# Patient Record
Sex: MALE | Race: WHITE | NOT HISPANIC OR LATINO
[De-identification: names, ages, dates, MRNs, and addresses within clinical notes are randomized per-mention and may not be internally consistent; named-entity substitution may affect disease eponyms.]

---

## 2020-07-14 PROBLEM — Z00.00 ENCOUNTER FOR PREVENTIVE HEALTH EXAMINATION: Status: ACTIVE | Noted: 2020-07-14

## 2020-07-15 ENCOUNTER — RESULT REVIEW (OUTPATIENT)
Age: 70
End: 2020-07-15

## 2020-07-15 ENCOUNTER — LABORATORY RESULT (OUTPATIENT)
Age: 70
End: 2020-07-15

## 2020-07-15 ENCOUNTER — APPOINTMENT (OUTPATIENT)
Dept: GASTROENTEROLOGY | Facility: CLINIC | Age: 70
End: 2020-07-15
Payer: MEDICARE

## 2020-07-15 VITALS
WEIGHT: 170 LBS | DIASTOLIC BLOOD PRESSURE: 60 MMHG | TEMPERATURE: 98.7 F | HEART RATE: 80 BPM | OXYGEN SATURATION: 96 % | BODY MASS INDEX: 25.76 KG/M2 | SYSTOLIC BLOOD PRESSURE: 158 MMHG | HEIGHT: 68 IN

## 2020-07-15 LAB
A1AT SERPL-MCNC: 216 MG/DL
CERULOPLASMIN SERPL-MCNC: 29 MG/DL
FERRITIN SERPL-MCNC: 300 NG/ML

## 2020-07-15 PROCEDURE — 36415 COLL VENOUS BLD VENIPUNCTURE: CPT

## 2020-07-15 PROCEDURE — 99214 OFFICE O/P EST MOD 30 MIN: CPT | Mod: 25

## 2020-07-15 NOTE — ASSESSMENT
[FreeTextEntry1] : -LFTs/abd pain - despite neg US, will pursue MRCP to eval for sludge/stones.  Serologic w/u  ordered as well.\par Doubt recurrence of ampullary lesion w/ prior negative long term f/u, neg US for scotty dil, staggered symptoms of pain.  However, EUS/EGD may be required if no findings.\par \par - Colon cancer screening - colonoscopy due in 11/2020 - needs extended preps.\par \par \par

## 2020-07-15 NOTE — HISTORY OF PRESENT ILLNESS
[FreeTextEntry1] : 70m AF/flutter/ablation, appy, choly, HLD, HTN, GERD, preDM, diverticulitis, colon polyps, periampullary adenoma (villous) - at [facility] [place] ('03), multiple negative evals since, here for abnormal LFTs -\par \par Called by PMD yesterday for abnromal labs:\par 7/13/20 CBC, amylase, lip normal\par AST//138, , TB 5.5/DB 3.5\par US this AM: s/p cholyu, no scotty dil\par \par In speaking to pt, states that for last week, he has had sharp upper abd pain intermittently, reminiscent of his initial gallbladder pain.  Sx severe,  nonprandial, but discrete on/off.  No fever/n/v.  Had dark urine 5d ago w/ worst bout - today urine clear.  No wt loss.  No pain now.  No new meds/herbals/OTCs exc Multivitamin and berry extract.\par  ________________________________________________\par        MRI 2009 low attn liver lesion superior liver, hemangioma, pneumobilia\par        CT 2009 2 small low attn lesion liver; adrenal lesion 1.6cm.\par        EGD 3/2009 gastritis, no amp. adenoma\par        Colonoscopy 2010 ([facility]) 3 polyps - int. hem., limitations in prep noted - 3 y f/u rec\par        EUS 2010 ( Dr. Gallandauer) ?chronic pancreatitis; L adrenal adenoma 1.6cm\par        Colonoscopy 11/2015 w/ nonadenomatous polyp - rec 5y for hx polyps.\par 2015, pt denied any sx at all. Pt referred for MRI to f/u chronic pancreatitis possibility from EUS, and also EGD side-viewing scope, but never f/u for either until \par 2017 eval epig pain: \par 9/2017 CT+:  Mild antral thickening may represent gastritis.  Stable 1.7 cm left adrenal nodule, likely adrenal adenoma. MRI can be performed as clinically indicated. Diverticula without evidence of diverticulitis. \par  No radiographic evidence of pancreatitis.  Nonobstructing nephrolithiasis lower pole left kidney. \par  Mildly enlarged prostate. (advosed  eval RE: adrenal and prostate, and EGD)\par 10/2017 EGD neg for abd pain (TJF scope - absent ampulla w/ bile flowing\par \par Soc:  no tobacco or significant EtOH\par FHx: no FHx GI malignancy or IBD\par \par ROS:\par Constitutional:: no weight loss, fevers\par ENT: no deafness\par Eyes: not blind\par Neck: no LN\par Chest: no dyspnea/cough\par Cardiac: no chest pain\par Vascular: no leg swelling\par GI: no abdominal pain, nausea, vomiting, diarrhea, constipation, rectal bleeding, dysphagia, melena unless otherwise noted in HPI\par : no dysuria, dark urine\par Skin: no rashes, jaundice\par Heme: no bleeding\par Endocrine: no DM unless otherwise stated in HPI\par \par Px: (VS noted below)\par General: NAD\par Eyes: anicteric\par Oropharynx:  clear\par Neck: no LN\par Chest: normal respiratory effort\par CVS: regular\par Abd: soft, NT, ND, +BS, no HSM\par Ext: no atrophy\par Neuro: grossly nonfocal\par \par Labs/imaging/prior endoscopic results reviewed to the extent available and noted in HPI\par

## 2020-07-17 ENCOUNTER — RESULT REVIEW (OUTPATIENT)
Age: 70
End: 2020-07-17

## 2020-07-17 LAB
ALBUMIN SERPL ELPH-MCNC: 4.6 G/DL
ALP BLD-CCNC: 222 U/L
ALT SERPL-CCNC: 171 U/L
ANA SER IF-ACNC: NEGATIVE
AST SERPL-CCNC: 60 U/L
BILIRUB DIRECT SERPL-MCNC: 0.7 MG/DL
BILIRUB INDIRECT SERPL-MCNC: 0.9 MG/DL
BILIRUB SERPL-MCNC: 1.6 MG/DL
ENDOMYSIUM IGA SER QL: NEGATIVE
ENDOMYSIUM IGA TITR SER: NORMAL
GLIADIN IGA SER QL: 5.7 UNITS
GLIADIN IGG SER QL: <5 UNITS
GLIADIN PEPTIDE IGA SER-ACNC: NEGATIVE
GLIADIN PEPTIDE IGG SER-ACNC: NEGATIVE
HBV CORE IGM SER QL: NONREACTIVE
HBV E AB SER QL: NEGATIVE
HBV E AG SER QL: NEGATIVE
HBV SURFACE AB SER QL: NONREACTIVE
HBV SURFACE AG SER QL: NONREACTIVE
HCV AB SER QL: ABNORMAL
HCV S/CO RATIO: 1.41 S/CO
HEPATITIS A IGG ANTIBODY: NONREACTIVE
IGA SER QL IEP: 381 MG/DL
IGG SER QL IEP: 1054 MG/DL
INR PPP: 1.02 RATIO
IRON SATN MFR SERPL: 21 %
IRON SERPL-MCNC: 57 UG/DL
LKM AB SER QL IF: <20.1 UNITS
MITOCHONDRIA AB SER IF-ACNC: NORMAL
PROT SERPL-MCNC: 6.9 G/DL
PT BLD: 12.1 SEC
SMOOTH MUSCLE AB SER QL IF: NORMAL
TIBC SERPL-MCNC: 272 UG/DL
TTG IGA SER IA-ACNC: <1.2 U/ML
TTG IGA SER-ACNC: NEGATIVE
TTG IGG SER IA-ACNC: 1.3 U/ML
TTG IGG SER IA-ACNC: NEGATIVE
UIBC SERPL-MCNC: 215 UG/DL

## 2020-07-21 ENCOUNTER — RESULT REVIEW (OUTPATIENT)
Age: 70
End: 2020-07-21

## 2020-07-23 ENCOUNTER — RESULT REVIEW (OUTPATIENT)
Age: 70
End: 2020-07-23

## 2020-07-23 ENCOUNTER — APPOINTMENT (OUTPATIENT)
Dept: GASTROENTEROLOGY | Facility: HOSPITAL | Age: 70
End: 2020-07-23

## 2020-07-29 ENCOUNTER — APPOINTMENT (OUTPATIENT)
Dept: GASTROENTEROLOGY | Facility: CLINIC | Age: 70
End: 2020-07-29
Payer: MEDICARE

## 2020-07-29 VITALS
HEIGHT: 68 IN | OXYGEN SATURATION: 96 % | HEART RATE: 67 BPM | WEIGHT: 170 LBS | BODY MASS INDEX: 25.76 KG/M2 | DIASTOLIC BLOOD PRESSURE: 74 MMHG | SYSTOLIC BLOOD PRESSURE: 130 MMHG

## 2020-07-29 DIAGNOSIS — R94.5 ABNORMAL RESULTS OF LIVER FUNCTION STUDIES: ICD-10-CM

## 2020-07-29 PROCEDURE — 36415 COLL VENOUS BLD VENIPUNCTURE: CPT

## 2020-07-29 PROCEDURE — 99214 OFFICE O/P EST MOD 30 MIN: CPT | Mod: 25

## 2020-07-29 NOTE — CONSULT LETTER
[FreeTextEntry1] : Dear Dr. Monae\par \par I had the pleasure of evaluating your patient,  KONSTANTIN TAVARES.\par \par Please refer to my note below.\par \par Thank you very much for allowing me to participate in the care of this patient.  If you have any questions, please do not hesitate to contact me.\par \par Sincerely, \par \par Austin Maravilla MD\par

## 2020-07-29 NOTE — ASSESSMENT
[FreeTextEntry1] : -epig discomfort - atypical for pancreatitis - improves w/ oral intake - repeat labs, trial PPI\par -panc cyst - repeat MRCP / MRI+/- 2years; adrenal adenoma stable vs 2017 f/u as per PMD if indicated\par - Colon cancer screening - colonoscopy due in 11/2020 - needs extended preps.\par \par \par

## 2020-07-29 NOTE — HISTORY OF PRESENT ILLNESS
[FreeTextEntry1] : 70m AF/flutter/ablation, appy, choly, HLD, HTN, GERD, preDM, diverticulitis, colon polyps, periampullary adenoma (villous) - at [facility] [place] ('03), multiple negative evals since, here for f/u - had inc tb to 5.5--MRCP w/ cbd stones --> ERCP 7/23/20 w./ Dr. Owusu - sphincterotomy/plasty/extensive stones/debris disimpacted.  Pt had upper abd pain that night, but did not call. Sx seemed to wane but had recurrent upper abdominal discomfort at night, most nights since, but no fever/n/v.  Sx improve w/ eating.  Went to ER 7/27 w/ mild transaminases, normal bili (all better than preprocedrue (AST normal, ALT 91,  vs 60/171/222) but lipase 67 (ULN 60) and CT+:Question very subtle infiltrative changes of the of the pancreatic head/neck parenchyma, correlate for biochemical evidence of pancreatitis\par \par  ________________________________________________\par        MRI 2009 low attn liver lesion superior liver, hemangioma, pneumobilia\par        CT 2009 2 small low attn lesion liver; adrenal lesion 1.6cm.\par        EGD 3/2009 gastritis, no amp. adenoma\par        Colonoscopy 2010 ([facility]) 3 polyps - int. hem., limitations in prep noted - 3 y f/u rec\par        EUS 2010 ( Dr. Gallandauer) ?chronic pancreatitis; L adrenal adenoma 1.6cm\par        Colonoscopy 11/2015 w/ nonadenomatous polyp - rec 5y for hx polyps.\par 2015, pt denied any sx at all. Pt referred for MRI to f/u chronic pancreatitis possibility from EUS, and also EGD side-viewing scope, but never f/u for either until \par 2017 eval epig pain: \par 9/2017 CT+:  Mild antral thickening may represent gastritis.  Stable 1.7 cm left adrenal nodule, likely adrenal adenoma. MRI can be performed as clinically indicated. Diverticula without evidence of diverticulitis. \par  No radiographic evidence of pancreatitis.  Nonobstructing nephrolithiasis lower pole left kidney. \par  Mildly enlarged prostate. (advosed  eval RE: adrenal and prostate, and EGD)\par 10/2017 EGD neg for abd pain (TJF scope - absent ampulla w/ bile flowing\par 7/2020 MRCP: Large common bile duct stone versus impacted sludge in the distal common bile duct measuring 2.1 x1.0 cm. Additional apparent filling defect within the left hepatic duct only seen on one of thesequences more likely artifactual or related to localized pneumobilia. Recommend correlation with\par ERCP. Moderate intrahepatic and extrahepatic biliary ductal dilatation, similar compared with a CT from\par 09/20/2017. 4 mm cystic pancreatic tail lesion. Advise follow-up MRI/MRCP with and without contrast in twoyears. 1.9 cm left adrenal adenoma.\par \par \par \par Soc:  no tobacco or significant EtOH\par FHx: no FHx GI malignancy or IBD\par \par ROS:\par Constitutional:: no weight loss, fevers\par ENT: no deafness\par Eyes: not blind\par Neck: no LN\par Chest: no dyspnea/cough\par Cardiac: no chest pain\par Vascular: no leg swelling\par GI: no abdominal pain, nausea, vomiting, diarrhea, constipation, rectal bleeding, dysphagia, melena unless otherwise noted in HPI\par : no dysuria, dark urine\par Skin: no rashes, jaundice\par Heme: no bleeding\par Endocrine: no DM unless otherwise stated in HPI\par \par Px: (VS noted below)\par General: NAD\par Eyes: anicteric\par Oropharynx:  clear\par Neck: no LN\par Chest: normal respiratory effort\par CVS: regular\par Abd: soft, NT, ND, +BS, no HSM\par Ext: no atrophy\par Neuro: grossly nonfocal\par \par Labs/imaging/prior endoscopic results reviewed to the extent available and noted in HPI\par

## 2020-07-30 LAB
ALBUMIN SERPL ELPH-MCNC: 4.6 G/DL
ALP BLD-CCNC: 151 U/L
ALT SERPL-CCNC: 77 U/L
AST SERPL-CCNC: 37 U/L
BILIRUB DIRECT SERPL-MCNC: 0.5 MG/DL
BILIRUB INDIRECT SERPL-MCNC: 0.7 MG/DL
BILIRUB SERPL-MCNC: 1.2 MG/DL
LPL SERPL-CCNC: 59 U/L
PROT SERPL-MCNC: 6.8 G/DL

## 2021-05-27 ENCOUNTER — TRANSCRIPTION ENCOUNTER (OUTPATIENT)
Age: 71
End: 2021-05-27

## 2021-05-27 ENCOUNTER — APPOINTMENT (OUTPATIENT)
Dept: GASTROENTEROLOGY | Facility: CLINIC | Age: 71
End: 2021-05-27
Payer: MEDICARE

## 2021-05-27 VITALS
DIASTOLIC BLOOD PRESSURE: 88 MMHG | WEIGHT: 170 LBS | HEART RATE: 65 BPM | OXYGEN SATURATION: 97 % | HEIGHT: 68 IN | TEMPERATURE: 97.2 F | BODY MASS INDEX: 25.76 KG/M2 | SYSTOLIC BLOOD PRESSURE: 138 MMHG

## 2021-05-27 DIAGNOSIS — R10.13 EPIGASTRIC PAIN: ICD-10-CM

## 2021-05-27 DIAGNOSIS — B37.81 CANDIDAL ESOPHAGITIS: ICD-10-CM

## 2021-05-27 DIAGNOSIS — K86.2 CYST OF PANCREAS: ICD-10-CM

## 2021-05-27 DIAGNOSIS — Z86.010 PERSONAL HISTORY OF COLONIC POLYPS: ICD-10-CM

## 2021-05-27 PROCEDURE — 99214 OFFICE O/P EST MOD 30 MIN: CPT

## 2021-05-27 NOTE — ASSESSMENT
[FreeTextEntry1] : -dysphagia/candida esophagitis - will extend fluconazole to 3 wk course for candida esophagitis.   Will f/u after that to see if sx improved.  His oropharyngeal component is not clearly explained by this - may consider criopharyngeal tx w/ Dr. Guerrero, possibly manometry, once we see effect of diflucan tx.  Has plan for repeat TNE with bx with ENT.  Will look for results when available.  Suspect the candida is secondary to his trial of prednisone recently for his muscle sx.  \par -panc cyst - repeat MRCP / MRI+/- 7/2022; adrenal adenoma stable vs 2017 f/u as per PMD if indicated\par -hx polyps/ Colon cancer screening - colonoscopy due in 11/2020 - needs extended preps - will schedule on f/u in 2-3 wks once dysphagia elucidated further.\par -other - pt to consider seeing neuro for his muscular pains in light of the hx of numbness UEs?\par \par \par

## 2021-05-27 NOTE — HISTORY OF PRESENT ILLNESS
[FreeTextEntry1] : 70m AF/flutter/ablation, appy, choly, ERCP/stones '20,  HLD, HTN, GERD, preDM, diverticulitis, colon polyps, periampullary adenoma (villous) - at [facility] [place] ('03), multiple negative evals since, here for f/u -\par \par New problem- dysphagia.  3 wks ago noted relatively acute onset of difficulty swallowing solids > liquids, with sensation of food sticking in lower neck.  Has been very anxious and eating less as a result.  No impactions or choking episodes.  He lost 10lbs in this time - though stable vs 1 y ago in this office.  No fever, melena.  \par \par Saw ENT - MBSS with some pharyngeal dysphagia; TNE w/ Dr. Guerrero - cinthia - started diflucan yesterday.  Has appt for f/u TNE with bx.  CT neck was done via Dr. Guerrero - results pending.\par \par He does state that he has had diffuse muscle pains over the last 3months with evaluations by  2 rheumatologists.  W/U reportedly neg for polymyositis - had trial of prednisone even - w/o improvement.  He also reported some numbness in b/l UEs distally - on waking, but resolves.  No weakness.\par \par  ________________________________________________\par 2009 2009 low attn liver lesion superior liver, hemangioma, pneumobilia\par        CT 2009 2 small low attn lesion liver; adrenal lesion 1.6cm.\par        EGD 3/2009 gastritis, no amp. adenoma\par        Colonoscopy 2010 ([facility]) 3 polyps - int. hem., limitations in prep noted - 3 y f/u rec\par        EUS 2010 ( Dr. Gallandauer) ?chronic pancreatitis; L adrenal adenoma 1.6cm\par        Colonoscopy 11/2015 w/ nonadenomatous polyp - rec 5y for hx polyps.\par 2015, pt denied any sx at all. Pt referred for MRI to f/u chronic pancreatitis possibility from EUS, and also EGD side-viewing scope, but never f/u for either until \par 2017 eval epig pain: \par 9/2017 CT+:  Mild antral thickening may represent gastritis.  Stable 1.7 cm left adrenal nodule, likely adrenal adenoma. MRI can be performed as clinically indicated. Diverticula without evidence of diverticulitis. \par  No radiographic evidence of pancreatitis.  Nonobstructing nephrolithiasis lower pole left kidney. \par  Mildly enlarged prostate. (advosed  eval RE: adrenal and prostate, and EGD)\par 10/2017 EGD neg for abd pain (TJF scope - absent ampulla w/ bile flowing\par 7/2020 MRCP: Large common bile duct stone versus impacted sludge in the distal common bile duct measuring 2.1 x1.0 cm. Additional apparent filling defect within the left hepatic duct only seen on one of thesequences more likely artifactual or related to localized pneumobilia. Recommend correlation with\par ERCP. Moderate intrahepatic and extrahepatic biliary ductal dilatation, similar compared with a CT from\par 09/20/2017. 4 mm cystic pancreatic tail lesion. Advise follow-up MRI/MRCP with and without contrast in twoyears. 1.9 cm left adrenal adenoma.\par 2020:  inc tb to 5.5--MRCP w/ cbd stones --> ERCP 7/23/20 w./ Dr. Owusu - sphincterotomy/plasty/extensive stones/debris disimpacted.  Very mild pancreatitis post-procedure. (ER 7/27/21 upper abd pain w/ mild transaminases, normal bili (all better than preprocedrue (AST normal, ALT 91,  vs 60/171/222) but lipase 67 (ULN 60) and CT+:Question very subtle infiltrative changes of the of the pancreatic head/neck parenchyma, correlate for biochemical evidence of pancreatitis\par \par \par Soc:  no tobacco or significant EtOH\par FHx: no FHx GI malignancy or IBD\par \par ROS:\par Constitutional:: no weight loss, fevers\par ENT: no deafness\par Eyes: not blind\par Neck: no LN\par Chest: no dyspnea/cough\par Cardiac: no chest pain\par Vascular: no leg swelling\par GI: no abdominal pain, nausea, vomiting, diarrhea, constipation, rectal bleeding, dysphagia, melena unless otherwise noted in HPI\par : no dysuria, dark urine\par Skin: no rashes, jaundice\par Heme: no bleeding\par Endocrine: no DM unless otherwise stated in HPI\par \par Px: (VS noted below)\par General: NAD\par Eyes: anicteric\par Oropharynx:  clear\par Neck: no LN\par Chest: normal respiratory effort\par CVS: regular\par Abd: soft, NT, ND, +BS, no HSM\par Ext: no atrophy\par Neuro: grossly nonfocal\par \par Labs/imaging/prior endoscopic results reviewed to the extent available and noted in HPI\par

## 2021-06-09 ENCOUNTER — NON-APPOINTMENT (OUTPATIENT)
Age: 71
End: 2021-06-09

## 2021-06-15 ENCOUNTER — APPOINTMENT (OUTPATIENT)
Dept: GASTROENTEROLOGY | Facility: HOSPITAL | Age: 71
End: 2021-06-15

## 2021-06-17 ENCOUNTER — NON-APPOINTMENT (OUTPATIENT)
Age: 71
End: 2021-06-17

## 2021-06-25 ENCOUNTER — APPOINTMENT (OUTPATIENT)
Dept: NEUROLOGY | Facility: CLINIC | Age: 71
End: 2021-06-25
Payer: MEDICARE

## 2021-06-25 VITALS
TEMPERATURE: 97.5 F | HEIGHT: 68 IN | SYSTOLIC BLOOD PRESSURE: 150 MMHG | BODY MASS INDEX: 25.46 KG/M2 | DIASTOLIC BLOOD PRESSURE: 87 MMHG | HEART RATE: 75 BPM | WEIGHT: 168 LBS

## 2021-06-25 DIAGNOSIS — R13.10 DYSPHAGIA, UNSPECIFIED: ICD-10-CM

## 2021-06-25 DIAGNOSIS — K14.6 GLOSSODYNIA: ICD-10-CM

## 2021-06-25 DIAGNOSIS — R29.2 ABNORMAL REFLEX: ICD-10-CM

## 2021-06-25 PROCEDURE — 99204 OFFICE O/P NEW MOD 45 MIN: CPT

## 2021-06-25 RX ORDER — FLUCONAZOLE 100 MG/1
100 TABLET ORAL DAILY
Qty: 14 | Refills: 0 | Status: DISCONTINUED | COMMUNITY
Start: 2021-05-27 | End: 2021-06-25

## 2021-06-25 RX ORDER — NEBIVOLOL HYDROCHLORIDE 5 MG/1
5 TABLET ORAL
Refills: 0 | Status: ACTIVE | COMMUNITY

## 2021-06-25 RX ORDER — MULTIVITAMIN
TABLET ORAL
Refills: 0 | Status: ACTIVE | COMMUNITY

## 2021-06-25 RX ORDER — ROSUVASTATIN CALCIUM 5 MG/1
TABLET, FILM COATED ORAL
Refills: 0 | Status: DISCONTINUED | COMMUNITY
End: 2021-06-25

## 2021-06-25 RX ORDER — PSYLLIUM HUSK 0.4 G
CAPSULE ORAL
Refills: 0 | Status: ACTIVE | COMMUNITY

## 2021-06-25 RX ORDER — BLOOD SUGAR DIAGNOSTIC
100 STRIP MISCELLANEOUS
Refills: 0 | Status: ACTIVE | COMMUNITY

## 2021-06-25 RX ORDER — OMEGA-3/DHA/EPA/FISH OIL 300-1000MG
CAPSULE ORAL
Refills: 0 | Status: ACTIVE | COMMUNITY

## 2021-06-25 RX ORDER — ROSUVASTATIN CALCIUM 10 MG/1
10 TABLET, FILM COATED ORAL
Refills: 0 | Status: ACTIVE | COMMUNITY

## 2021-06-25 RX ORDER — OMEPRAZOLE 40 MG/1
40 CAPSULE, DELAYED RELEASE ORAL
Qty: 30 | Refills: 5 | Status: DISCONTINUED | COMMUNITY
Start: 2020-07-29 | End: 2021-06-25

## 2021-06-28 NOTE — ASSESSMENT
[FreeTextEntry1] : Miguel Ángel Penn is a 71 year old man with diffuse muscle aches but no weakness.  Unclear etiology.  He reports negative work up by two rheumatologists. \par EMG arms and legs.\par Hyperreflexia and mild spasticity- MRI cervical spine w/wo - r/o cervical spondylotic myelopathy.\par \par Dysphagia which was of sudden onset on 5/9-10 - the differential diagnosis includes: stroke.  \par \par MRI Brain to also rule out any structural lesion. Attention to brain stem.\par \par Speech therapy referral. \par \par Discussed with Dr. Guerrero ENT. \par \par Follow up one week after EMG.\par \par I discussed in detail with the patient the diagnosis, prognosis, treatment plan and answered all of his questions.\par \par

## 2021-06-28 NOTE — HISTORY OF PRESENT ILLNESS
[FreeTextEntry1] : Miguel Ángel Penn is a 71 year old man with a history of atrial flutter, hypertension, hyperlipidemia, history of carpal tunnel syndrome (no surgery), presenting to the office for a consultation for widespread muscle pain. \par \par He endorses in February 2021, he began to have lower cervical and upper thoracic muscle pain that began to spread throughout the upper and lower extremities. He describes it as diffuse muscle pain that improved with working out. He saw a rheumatologist in [place] and polymyositis was ruled out. he saw a second rheumatologist when he returned to [place] and no issues were found. \par No weakness. \par He suddenly had difficulty swallowing 5/9-5/10 and is on a puree diet. This does not fluctuate.  He saw ENT and is planning on having a scope performed to check his esophagus function. He did not see speech pathology. He also endorses losing 15 pounds since the start of his symptoms. Mr. Penn has not had any brain or spinal imaging done. Denies radiating pain from his neck down his arm or back down his leg. Denies bowel or bladder difficulties.  No ptosis, no diplopia, no generalized weakness or neck weakness. \par \par His last MRI Cervical spine was 10-12 years ago. Denies any numbness in the feet. \par \par He had the same muscle pain twenty years ago and it went away. \par \par He continues to have the muscle aching and difficulty swallowing with no improvement since onset. \par \par He has numbness and tingling to the hands that is transient and possibly recurred a few weeks ago.\par \par Today, he woke up with his whole tongue burning and it has improved since this morning. \par \par He has a hand and head tremor for unknown period of time. \par \par Denies any injuries, alcohol, or smoking. \par \par Denies ptosis or diplopia. Denies neck weakness. \par \par The remaining neurological review of systems is negative.

## 2021-06-28 NOTE — PHYSICAL EXAM
[FreeTextEntry1] : Physical examination \par General: No acute distress, Awake, Alert.   \par other: head shaking tremor. \par \par Mental status \par Awake, alert, gives detailed history. \par \par Cranial Nerves \par II: VFF  \par III, IV, VI: PERRL, EOMI.   \par V: Facial sensation is normal B/L.   \par VII: Facial strength is normal B/L. \par \par \par VIII: Decreased hearing, left.\par \par IX, X: Palate is midline and elevates symmetrically.   \par XI: Trapezius normal strength.   \par XII: Tongue midline without atrophy or fasciculations. \par \par Motor exam  \par Muscle tone - cogwheel arms. Mild spasticity legs left > right. \par \par Fasciculations only in the bilateral quadriceps.\par Questionable wasting in the infraspinatus, B. No scapular winging. \par \par Muscle Strength: arms and legs, proximal and distal flexors and extensors are normal \par \par No UE drift.\par \par Reflexes \par Left tricep 0\par Right tricep 2\par Remaining reflexes 3 \par \par Plantars right: mute.   \par Plantars left: mute.   \par \par \par Coordination \par Finger to nose: Normal.  \par Heel to shin: Normal.   \par \par left hand tremor. \par \par Sensory \par Intact sensation to vibration and cold.\par  \par Negative Tinel's.\par \par Gait \par Normal including heels, toes, and tandem gait.  \par  \par \par

## 2021-06-28 NOTE — CONSULT LETTER
[Dear  ___] : Dear  [unfilled], [Consult Letter:] : I had the pleasure of evaluating your patient, [unfilled]. [Please see my note below.] : Please see my note below. [Consult Closing:] : Thank you very much for allowing me to participate in the care of this patient.  If you have any questions, please do not hesitate to contact me. [Sincerely,] : Sincerely, [FreeTextEntry3] : Tarah Junior M.D.\par Pallavi Chow N.P.\par

## 2021-07-08 ENCOUNTER — RESULT REVIEW (OUTPATIENT)
Age: 71
End: 2021-07-08

## 2021-07-13 ENCOUNTER — APPOINTMENT (OUTPATIENT)
Dept: NEUROLOGY | Facility: CLINIC | Age: 71
End: 2021-07-13
Payer: MEDICARE

## 2021-07-13 DIAGNOSIS — M79.10 MYALGIA, UNSPECIFIED SITE: ICD-10-CM

## 2021-07-13 DIAGNOSIS — G56.23 LESION OF ULNAR NERVE, BILATERAL UPPER LIMBS: ICD-10-CM

## 2021-07-13 DIAGNOSIS — G56.03 CARPAL TUNNEL SYNDROM,BILATERAL UPPER LIMBS: ICD-10-CM

## 2021-07-13 PROCEDURE — 95886 MUSC TEST DONE W/N TEST COMP: CPT

## 2021-07-13 PROCEDURE — 95913 NRV CNDJ TEST 13/> STUDIES: CPT

## 2021-07-14 PROBLEM — G56.23 ULNAR NEUROPATHY OF BOTH UPPER EXTREMITIES: Status: ACTIVE | Noted: 2021-07-14

## 2021-09-08 ENCOUNTER — APPOINTMENT (OUTPATIENT)
Dept: NEUROLOGY | Facility: CLINIC | Age: 71
End: 2021-09-08
Payer: MEDICARE

## 2021-09-08 DIAGNOSIS — R53.1 WEAKNESS: ICD-10-CM

## 2021-09-08 PROBLEM — M79.10 MUSCLE PAIN: Status: ACTIVE | Noted: 2021-06-25

## 2021-09-08 PROCEDURE — 95886 MUSC TEST DONE W/N TEST COMP: CPT

## 2021-09-08 PROCEDURE — 95911 NRV CNDJ TEST 9-10 STUDIES: CPT

## 2021-09-21 ENCOUNTER — APPOINTMENT (OUTPATIENT)
Dept: NEUROLOGY | Facility: CLINIC | Age: 71
End: 2021-09-21

## 2022-03-30 ENCOUNTER — RESULT REVIEW (OUTPATIENT)
Age: 72
End: 2022-03-30

## 2022-03-31 ENCOUNTER — TRANSCRIPTION ENCOUNTER (OUTPATIENT)
Age: 72
End: 2022-03-31

## 2022-03-31 ENCOUNTER — APPOINTMENT (OUTPATIENT)
Dept: GASTROENTEROLOGY | Facility: HOSPITAL | Age: 72
End: 2022-03-31

## 2022-04-01 ENCOUNTER — TRANSCRIPTION ENCOUNTER (OUTPATIENT)
Age: 72
End: 2022-04-01

## 2023-10-01 PROBLEM — G56.03 CARPAL TUNNEL SYNDROME, BILATERAL UPPER LIMBS: Status: ACTIVE | Noted: 2021-07-14
